# Patient Record
Sex: MALE | Race: WHITE | Employment: FULL TIME | URBAN - NONMETROPOLITAN AREA
[De-identification: names, ages, dates, MRNs, and addresses within clinical notes are randomized per-mention and may not be internally consistent; named-entity substitution may affect disease eponyms.]

---

## 2020-08-20 ENCOUNTER — TELEPHONE (OUTPATIENT)
Dept: FAMILY MEDICINE CLINIC | Age: 20
End: 2020-08-20

## 2020-08-20 NOTE — TELEPHONE ENCOUNTER
Received a vaccine verification form for patient. Called number on form to verify [phone]. Voicemail says the name is Ulysses Kumar. Patient's phone number in chart is disconnected. Will wait for return call from Valentino Borer. Verify fax number to send form.

## 2021-09-24 ENCOUNTER — OFFICE VISIT (OUTPATIENT)
Dept: FAMILY MEDICINE CLINIC | Age: 21
End: 2021-09-24
Payer: COMMERCIAL

## 2021-09-24 VITALS
HEIGHT: 72 IN | DIASTOLIC BLOOD PRESSURE: 62 MMHG | TEMPERATURE: 98.1 F | SYSTOLIC BLOOD PRESSURE: 122 MMHG | BODY MASS INDEX: 21.81 KG/M2 | HEART RATE: 76 BPM | OXYGEN SATURATION: 97 % | WEIGHT: 161 LBS

## 2021-09-24 DIAGNOSIS — Z00.01 ENCOUNTER FOR WELL ADULT EXAM WITH ABNORMAL FINDINGS: Primary | ICD-10-CM

## 2021-09-24 DIAGNOSIS — F48.1 DEPERSONALIZATION-DEREALIZATION DISORDER (HCC): ICD-10-CM

## 2021-09-24 PROCEDURE — 99395 PREV VISIT EST AGE 18-39: CPT | Performed by: FAMILY MEDICINE

## 2021-09-24 ASSESSMENT — PATIENT HEALTH QUESTIONNAIRE - PHQ9
SUM OF ALL RESPONSES TO PHQ9 QUESTIONS 1 & 2: 0
SUM OF ALL RESPONSES TO PHQ QUESTIONS 1-9: 0
1. LITTLE INTEREST OR PLEASURE IN DOING THINGS: 0
SUM OF ALL RESPONSES TO PHQ QUESTIONS 1-9: 0
2. FEELING DOWN, DEPRESSED OR HOPELESS: 0
SUM OF ALL RESPONSES TO PHQ QUESTIONS 1-9: 0

## 2021-09-24 ASSESSMENT — ENCOUNTER SYMPTOMS
COUGH: 0
SINUS PAIN: 0
NAUSEA: 0
DIARRHEA: 0
SHORTNESS OF BREATH: 0
CHEST TIGHTNESS: 0
ABDOMINAL PAIN: 0
SORE THROAT: 0
VOMITING: 0
WHEEZING: 0
EYE PAIN: 0
CONSTIPATION: 0
BACK PAIN: 0

## 2021-09-24 NOTE — PROGRESS NOTES
21    Name: Victorino Meng  :2000   Sex:male   Age:21 y.o. Chief Complaint   Patient presents with    Employment Physical     Patient presents to office for visit. Patient says he was living in [place] until somewhat recently. He says while he was in [place] he received treatment for four months for what was thought to be panic attacks. Patient says he was diagnosed with derealization disorder and treated with medication. Patient says that stress makes his symptoms reoccur. He is working for [employer] and patient says this job is stressful and he started to have derealization symptoms and explained this to his employer. Patient's employer will not allow him to come back to work until a doctor says that he is fit to do his job safely. Patient has been off work for a week and a half now. He does not remember the name of the medication he took. He stopped taking the medication six months ago because he thought he did not need it anymore.      Patient here for follow up on stress and anxiety  Was in [place] until a few months ago  While there he was diagnosed with derealization disorder  He was started on an antidepressant he thinks but he stopped it 6 months ago b/c he was feeling better and did not think he needed' it    He recently moved here living with girlfriend in [facility]  Mom lives locally  He was working for [employer] but was getting stressed out and told his boss he felt like he needed some time off and that he has that disorder  Now he wants to go back to work but his boss feels he needs cleared to come back 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 HENT: Negative for congestion, ear pain, hearing loss, sinus pain and sore throat. Eyes: Negative for pain. Respiratory: Negative for cough, chest tightness, shortness of breath and wheezing. Cardiovascular: Negative for chest pain, palpitations and leg swelling. Gastrointestinal: Negative for abdominal pain, constipation, diarrhea, nausea and vomiting. Endocrine: Negative for cold intolerance and heat intolerance. Genitourinary: Negative for difficulty urinating, dysuria, frequency, hematuria, scrotal swelling, testicular pain and urgency. Musculoskeletal: Negative for arthralgias, back pain, gait problem, joint swelling and myalgias. Skin: Negative for rash and wound. Neurological: Negative for dizziness, syncope, light-headedness and headaches. Hematological: Negative for adenopathy. Psychiatric/Behavioral: Negative for confusion, dysphoric mood, self-injury, sleep disturbance and suicidal ideas. The patient is not nervous/anxious. No current outpatient medications on file. No Known Allergies   Past Medical History:   Diagnosis Date    History of concussion      There are no problems to display for this patient. Past Surgical History:   Procedure Laterality Date    APPENDECTOMY  10/2015      Social History     Tobacco History     Smoking Status  Never Smoker    Smokeless Tobacco Use  Never Used          Alcohol History     Alcohol Use Status  Yes          Drug Use     Drug Use Status  Never          Sexual Activity     Sexually Active  Not Asked            /62   Pulse 76   Temp 98.1 °F (36.7 °C)   Ht 6' (1.829 m)   Wt 161 lb (73 kg)   SpO2 97%   BMI 21.84 kg/m²     EXAM:   Physical Exam  Vitals and nursing note reviewed. Constitutional:       General: He is not in acute distress. Appearance: Normal appearance. He is well-developed. He is not ill-appearing.    HENT:      Head: Normocephalic and atraumatic. Right Ear: Tympanic membrane normal.      Left Ear: Tympanic membrane normal.      Nose: Nose normal.      Mouth/Throat:      Mouth: Mucous membranes are moist.   Eyes:      Pupils: Pupils are equal, round, and reactive to light. Cardiovascular:      Rate and Rhythm: Normal rate and regular rhythm. Pulmonary:      Effort: Pulmonary effort is normal.      Breath sounds: Normal breath sounds. Abdominal:      General: Bowel sounds are normal.      Palpations: Abdomen is soft. Musculoskeletal:      Cervical back: Normal range of motion. Comments: Gait steady in the office today   Skin:     General: Skin is warm and dry. Neurological:      Mental Status: He is alert and oriented to person, place, and time. Mental status is at baseline. Psychiatric:         Mood and Affect: Mood normal.         Thought Content: Thought content normal.          Yovana Rachel was seen today for employment physical.    Diagnoses and all orders for this visit:    Encounter for well adult exam with abnormal findings    Depersonalization-derealization disorder ([facility] [address]  Comments:  was diagnosed in [place] and was on meds but stopped them 6mos ago  referred to psych  I do not feel comfortable clearing him for work  Orders:  -     Cancel: External Referral To Psychiatry  -     External Referral To Psychiatry        I independently reviewed and updated the chief complaint, HPI, past medical and surgical history, medications, allergies and ROS as entered by the LPN. Seen by:   Narinder German,